# Patient Record
Sex: MALE | Race: WHITE | NOT HISPANIC OR LATINO | Employment: FULL TIME | URBAN - METROPOLITAN AREA
[De-identification: names, ages, dates, MRNs, and addresses within clinical notes are randomized per-mention and may not be internally consistent; named-entity substitution may affect disease eponyms.]

---

## 2022-08-10 ENCOUNTER — TRANSFERRED RECORDS (OUTPATIENT)
Dept: HEALTH INFORMATION MANAGEMENT | Facility: CLINIC | Age: 67
End: 2022-08-10

## 2022-12-07 ENCOUNTER — ANESTHESIA EVENT (OUTPATIENT)
Dept: SURGERY | Facility: CLINIC | Age: 67
End: 2022-12-07
Payer: COMMERCIAL

## 2022-12-07 RX ORDER — ACETAMINOPHEN 500 MG
500-1000 TABLET ORAL EVERY 6 HOURS PRN
COMMUNITY

## 2022-12-07 NOTE — PROGRESS NOTES
PTA medications updated by Medication Scribe prior to surgery via phone call with patient (last doses completed by Nurse)     Medication history sources: Patient and H&P  In the past week, patient estimated taking medication this percent of the time: Greater than 90%  Adherence assessment: N/A Not Observed    Significant changes made to the medication list:  None      Additional medication history information:   None    Medication reconciliation completed by provider prior to medication history? No    Time spent in this activity: 15 minutes    The information provided in this note is only as accurate as the sources available at the time of update(s)    Prior to Admission medications    Medication Sig Last Dose Taking? Auth Provider Long Term End Date   acetaminophen (TYLENOL) 500 MG tablet Take 500-1,000 mg by mouth every 6 hours as needed for mild pain  at PRN Yes Reported, Patient     Misc Natural Products (OSTEO BI-FLEX JOINT SHIELD PO) Take 1 capsule by mouth daily 12/1/2022 at AM Yes Reported, Patient     Multiple Vitamin (MULTIVITAMIN PO) Take 1 tablet by mouth daily 12/1/2022 at AM Yes Reported, Patient       Medication history completed by:    Gabriele Murphy CPhT  Medication Scribe  [facility]

## 2022-12-08 ENCOUNTER — HOSPITAL ENCOUNTER (INPATIENT)
Facility: CLINIC | Age: 67
LOS: 1 days | Discharge: HOME | End: 2022-12-09
Attending: ORTHOPAEDIC SURGERY | Admitting: ORTHOPAEDIC SURGERY
Payer: COMMERCIAL

## 2022-12-08 ENCOUNTER — APPOINTMENT (OUTPATIENT)
Dept: GENERAL RADIOLOGY | Facility: CLINIC | Age: 67
End: 2022-12-08
Attending: ORTHOPAEDIC SURGERY
Payer: COMMERCIAL

## 2022-12-08 ENCOUNTER — ANESTHESIA (OUTPATIENT)
Dept: SURGERY | Facility: CLINIC | Age: 67
End: 2022-12-08
Payer: COMMERCIAL

## 2022-12-08 DIAGNOSIS — Z96.661 STATUS POST RIGHT ANKLE JOINT REPLACEMENT: Primary | ICD-10-CM

## 2022-12-08 PROCEDURE — 250N000009 HC RX 250: Performed by: NURSE ANESTHETIST, CERTIFIED REGISTERED

## 2022-12-08 PROCEDURE — 360N000084 HC SURGERY LEVEL 4 W/ FLUORO, PER MIN: Performed by: ORTHOPAEDIC SURGERY

## 2022-12-08 PROCEDURE — 250N000011 HC RX IP 250 OP 636: Performed by: PHYSICIAN ASSISTANT

## 2022-12-08 PROCEDURE — 250N000013 HC RX MED GY IP 250 OP 250 PS 637: Performed by: PHYSICIAN ASSISTANT

## 2022-12-08 PROCEDURE — 258N000003 HC RX IP 258 OP 636: Performed by: PHYSICIAN ASSISTANT

## 2022-12-08 PROCEDURE — 250N000011 HC RX IP 250 OP 636: Performed by: NURSE ANESTHETIST, CERTIFIED REGISTERED

## 2022-12-08 PROCEDURE — C1776 JOINT DEVICE (IMPLANTABLE): HCPCS | Performed by: ORTHOPAEDIC SURGERY

## 2022-12-08 PROCEDURE — C1713 ANCHOR/SCREW BN/BN,TIS/BN: HCPCS | Performed by: ORTHOPAEDIC SURGERY

## 2022-12-08 PROCEDURE — 999N000141 HC STATISTIC PRE-PROCEDURE NURSING ASSESSMENT: Performed by: ORTHOPAEDIC SURGERY

## 2022-12-08 PROCEDURE — 250N000009 HC RX 250: Performed by: ANESTHESIOLOGY

## 2022-12-08 PROCEDURE — 258N000003 HC RX IP 258 OP 636: Performed by: ANESTHESIOLOGY

## 2022-12-08 PROCEDURE — 0SRF0JA REPLACEMENT OF RIGHT ANKLE JOINT WITH SYNTHETIC SUBSTITUTE, UNCEMENTED, OPEN APPROACH: ICD-10-PCS | Performed by: ORTHOPAEDIC SURGERY

## 2022-12-08 PROCEDURE — 271N000001 HC OR GENERAL SUPPLY NON-STERILE: Performed by: ORTHOPAEDIC SURGERY

## 2022-12-08 PROCEDURE — 250N000011 HC RX IP 250 OP 636: Performed by: ANESTHESIOLOGY

## 2022-12-08 PROCEDURE — 120N000001 HC R&B MED SURG/OB

## 2022-12-08 PROCEDURE — 250N000025 HC SEVOFLURANE, PER MIN: Performed by: ORTHOPAEDIC SURGERY

## 2022-12-08 PROCEDURE — 0MUQ0JZ SUPPLEMENT RIGHT ANKLE BURSA AND LIGAMENT WITH SYNTHETIC SUBSTITUTE, OPEN APPROACH: ICD-10-PCS | Performed by: ORTHOPAEDIC SURGERY

## 2022-12-08 PROCEDURE — 710N000009 HC RECOVERY PHASE 1, LEVEL 1, PER MIN: Performed by: ORTHOPAEDIC SURGERY

## 2022-12-08 PROCEDURE — 370N000017 HC ANESTHESIA TECHNICAL FEE, PER MIN: Performed by: ORTHOPAEDIC SURGERY

## 2022-12-08 PROCEDURE — 999N000179 XR SURGERY CARM FLUORO LESS THAN 5 MIN W STILLS

## 2022-12-08 PROCEDURE — 272N000001 HC OR GENERAL SUPPLY STERILE: Performed by: ORTHOPAEDIC SURGERY

## 2022-12-08 PROCEDURE — 0L8N3ZZ DIVISION OF RIGHT LOWER LEG TENDON, PERCUTANEOUS APPROACH: ICD-10-PCS | Performed by: ORTHOPAEDIC SURGERY

## 2022-12-08 PROCEDURE — 250N000009 HC RX 250: Performed by: ORTHOPAEDIC SURGERY

## 2022-12-08 PROCEDURE — 0QPG04Z REMOVAL OF INTERNAL FIXATION DEVICE FROM RIGHT TIBIA, OPEN APPROACH: ICD-10-PCS | Performed by: ORTHOPAEDIC SURGERY

## 2022-12-08 DEVICE — TIBIAL TRAY, XL, SIZE 1
Type: IMPLANTABLE DEVICE | Site: ANKLE | Status: FUNCTIONAL
Brand: SALTO TALARIS®

## 2022-12-08 DEVICE — INSERT, SIZE 1, RIGHT, TH8
Type: IMPLANTABLE DEVICE | Site: ANKLE | Status: FUNCTIONAL
Brand: SALTO TALARIS®

## 2022-12-08 DEVICE — TALAR DOME, SIZE 1, RIGHT
Type: IMPLANTABLE DEVICE | Site: ANKLE | Status: FUNCTIONAL
Brand: SALTO TALARIS®

## 2022-12-08 DEVICE — DBL LOADED 4.75MM BC SWVLK
Type: IMPLANTABLE DEVICE | Site: ANKLE | Status: FUNCTIONAL
Brand: ARTHREX®

## 2022-12-08 DEVICE — IB KIT, BC, W/ CC FT AND JUMPSTART
Type: IMPLANTABLE DEVICE | Site: ANKLE | Status: FUNCTIONAL
Brand: ARTHREX®

## 2022-12-08 RX ORDER — ACETAMINOPHEN 325 MG/1
650 TABLET ORAL EVERY 4 HOURS PRN
Status: DISCONTINUED | OUTPATIENT
Start: 2022-12-11 | End: 2022-12-09 | Stop reason: HOSPADM

## 2022-12-08 RX ORDER — SODIUM CHLORIDE, SODIUM LACTATE, POTASSIUM CHLORIDE, CALCIUM CHLORIDE 600; 310; 30; 20 MG/100ML; MG/100ML; MG/100ML; MG/100ML
INJECTION, SOLUTION INTRAVENOUS CONTINUOUS
Status: DISCONTINUED | OUTPATIENT
Start: 2022-12-08 | End: 2022-12-08 | Stop reason: HOSPADM

## 2022-12-08 RX ORDER — EPHEDRINE SULFATE 50 MG/ML
INJECTION, SOLUTION INTRAMUSCULAR; INTRAVENOUS; SUBCUTANEOUS PRN
Status: DISCONTINUED | OUTPATIENT
Start: 2022-12-08 | End: 2022-12-08

## 2022-12-08 RX ORDER — MAGNESIUM HYDROXIDE 1200 MG/15ML
LIQUID ORAL PRN
Status: DISCONTINUED | OUTPATIENT
Start: 2022-12-08 | End: 2022-12-08 | Stop reason: HOSPADM

## 2022-12-08 RX ORDER — FENTANYL CITRATE 50 UG/ML
INJECTION, SOLUTION INTRAMUSCULAR; INTRAVENOUS PRN
Status: DISCONTINUED | OUTPATIENT
Start: 2022-12-08 | End: 2022-12-08

## 2022-12-08 RX ORDER — GABAPENTIN 300 MG/1
300 CAPSULE ORAL 3 TIMES DAILY
Qty: 9 CAPSULE | Refills: 0 | Status: SHIPPED | OUTPATIENT
Start: 2022-12-08 | End: 2022-12-11

## 2022-12-08 RX ORDER — ONDANSETRON 2 MG/ML
INJECTION INTRAMUSCULAR; INTRAVENOUS PRN
Status: DISCONTINUED | OUTPATIENT
Start: 2022-12-08 | End: 2022-12-08

## 2022-12-08 RX ORDER — OXYCODONE HYDROCHLORIDE 5 MG/1
10 TABLET ORAL EVERY 4 HOURS PRN
Status: DISCONTINUED | OUTPATIENT
Start: 2022-12-08 | End: 2022-12-09 | Stop reason: HOSPADM

## 2022-12-08 RX ORDER — ONDANSETRON 4 MG/1
4 TABLET, ORALLY DISINTEGRATING ORAL EVERY 30 MIN PRN
Status: DISCONTINUED | OUTPATIENT
Start: 2022-12-08 | End: 2022-12-08 | Stop reason: HOSPADM

## 2022-12-08 RX ORDER — ONDANSETRON 2 MG/ML
4 INJECTION INTRAMUSCULAR; INTRAVENOUS EVERY 6 HOURS PRN
Status: DISCONTINUED | OUTPATIENT
Start: 2022-12-08 | End: 2022-12-09 | Stop reason: HOSPADM

## 2022-12-08 RX ORDER — LIDOCAINE 40 MG/G
CREAM TOPICAL
Status: DISCONTINUED | OUTPATIENT
Start: 2022-12-08 | End: 2022-12-09 | Stop reason: HOSPADM

## 2022-12-08 RX ORDER — ONDANSETRON 4 MG/1
4 TABLET, ORALLY DISINTEGRATING ORAL EVERY 6 HOURS PRN
Status: DISCONTINUED | OUTPATIENT
Start: 2022-12-08 | End: 2022-12-09 | Stop reason: HOSPADM

## 2022-12-08 RX ORDER — HYDROMORPHONE HCL IN WATER/PF 6 MG/30 ML
0.4 PATIENT CONTROLLED ANALGESIA SYRINGE INTRAVENOUS
Status: DISCONTINUED | OUTPATIENT
Start: 2022-12-08 | End: 2022-12-09 | Stop reason: HOSPADM

## 2022-12-08 RX ORDER — ACETAMINOPHEN 325 MG/1
975 TABLET ORAL EVERY 8 HOURS
Status: DISCONTINUED | OUTPATIENT
Start: 2022-12-08 | End: 2022-12-09 | Stop reason: HOSPADM

## 2022-12-08 RX ORDER — CEFAZOLIN SODIUM/WATER 2 G/20 ML
2 SYRINGE (ML) INTRAVENOUS
Status: DISCONTINUED | OUTPATIENT
Start: 2022-12-08 | End: 2022-12-08 | Stop reason: HOSPADM

## 2022-12-08 RX ORDER — HYDROMORPHONE HCL IN WATER/PF 6 MG/30 ML
0.2 PATIENT CONTROLLED ANALGESIA SYRINGE INTRAVENOUS
Status: DISCONTINUED | OUTPATIENT
Start: 2022-12-08 | End: 2022-12-09 | Stop reason: HOSPADM

## 2022-12-08 RX ORDER — NALOXONE HYDROCHLORIDE 0.4 MG/ML
0.4 INJECTION, SOLUTION INTRAMUSCULAR; INTRAVENOUS; SUBCUTANEOUS
Status: DISCONTINUED | OUTPATIENT
Start: 2022-12-08 | End: 2022-12-09 | Stop reason: HOSPADM

## 2022-12-08 RX ORDER — ONDANSETRON 2 MG/ML
4 INJECTION INTRAMUSCULAR; INTRAVENOUS EVERY 30 MIN PRN
Status: DISCONTINUED | OUTPATIENT
Start: 2022-12-08 | End: 2022-12-08 | Stop reason: HOSPADM

## 2022-12-08 RX ORDER — DEXAMETHASONE SODIUM PHOSPHATE 4 MG/ML
INJECTION, SOLUTION INTRA-ARTICULAR; INTRALESIONAL; INTRAMUSCULAR; INTRAVENOUS; SOFT TISSUE PRN
Status: DISCONTINUED | OUTPATIENT
Start: 2022-12-08 | End: 2022-12-08

## 2022-12-08 RX ORDER — SODIUM CHLORIDE 9 MG/ML
INJECTION, SOLUTION INTRAVENOUS CONTINUOUS
Status: DISCONTINUED | OUTPATIENT
Start: 2022-12-08 | End: 2022-12-09 | Stop reason: HOSPADM

## 2022-12-08 RX ORDER — PROPOFOL 10 MG/ML
INJECTION, EMULSION INTRAVENOUS PRN
Status: DISCONTINUED | OUTPATIENT
Start: 2022-12-08 | End: 2022-12-08

## 2022-12-08 RX ORDER — FENTANYL CITRATE 50 UG/ML
25 INJECTION, SOLUTION INTRAMUSCULAR; INTRAVENOUS EVERY 5 MIN PRN
Status: DISCONTINUED | OUTPATIENT
Start: 2022-12-08 | End: 2022-12-08 | Stop reason: HOSPADM

## 2022-12-08 RX ORDER — POLYETHYLENE GLYCOL 3350 17 G/17G
17 POWDER, FOR SOLUTION ORAL DAILY
Status: DISCONTINUED | OUTPATIENT
Start: 2022-12-09 | End: 2022-12-09 | Stop reason: HOSPADM

## 2022-12-08 RX ORDER — CEFAZOLIN SODIUM/WATER 2 G/20 ML
2 SYRINGE (ML) INTRAVENOUS SEE ADMIN INSTRUCTIONS
Status: DISCONTINUED | OUTPATIENT
Start: 2022-12-08 | End: 2022-12-08 | Stop reason: HOSPADM

## 2022-12-08 RX ORDER — FENTANYL CITRATE 50 UG/ML
50 INJECTION, SOLUTION INTRAMUSCULAR; INTRAVENOUS EVERY 5 MIN PRN
Status: DISCONTINUED | OUTPATIENT
Start: 2022-12-08 | End: 2022-12-08 | Stop reason: HOSPADM

## 2022-12-08 RX ORDER — CEFAZOLIN SODIUM 1 G/3ML
1 INJECTION, POWDER, FOR SOLUTION INTRAMUSCULAR; INTRAVENOUS EVERY 8 HOURS
Status: COMPLETED | OUTPATIENT
Start: 2022-12-08 | End: 2022-12-09

## 2022-12-08 RX ORDER — AMOXICILLIN 250 MG
1-2 CAPSULE ORAL 2 TIMES DAILY
Qty: 8 TABLET | Refills: 0 | Status: SHIPPED | OUTPATIENT
Start: 2022-12-08

## 2022-12-08 RX ORDER — PROCHLORPERAZINE MALEATE 5 MG
5 TABLET ORAL EVERY 6 HOURS PRN
Status: DISCONTINUED | OUTPATIENT
Start: 2022-12-08 | End: 2022-12-09 | Stop reason: HOSPADM

## 2022-12-08 RX ORDER — HYDROMORPHONE HCL IN WATER/PF 6 MG/30 ML
0.4 PATIENT CONTROLLED ANALGESIA SYRINGE INTRAVENOUS EVERY 5 MIN PRN
Status: DISCONTINUED | OUTPATIENT
Start: 2022-12-08 | End: 2022-12-08 | Stop reason: HOSPADM

## 2022-12-08 RX ORDER — TRANEXAMIC ACID 650 MG/1
1950 TABLET ORAL ONCE
Status: COMPLETED | OUTPATIENT
Start: 2022-12-08 | End: 2022-12-08

## 2022-12-08 RX ORDER — FENTANYL CITRATE 0.05 MG/ML
50 INJECTION, SOLUTION INTRAMUSCULAR; INTRAVENOUS
Status: COMPLETED | OUTPATIENT
Start: 2022-12-08 | End: 2022-12-08

## 2022-12-08 RX ORDER — HYDROXYZINE HYDROCHLORIDE 10 MG/1
10 TABLET, FILM COATED ORAL EVERY 6 HOURS PRN
Qty: 30 TABLET | Refills: 0 | Status: SHIPPED | OUTPATIENT
Start: 2022-12-08

## 2022-12-08 RX ORDER — HYDROMORPHONE HCL IN WATER/PF 6 MG/30 ML
0.2 PATIENT CONTROLLED ANALGESIA SYRINGE INTRAVENOUS EVERY 5 MIN PRN
Status: DISCONTINUED | OUTPATIENT
Start: 2022-12-08 | End: 2022-12-08 | Stop reason: HOSPADM

## 2022-12-08 RX ORDER — NALOXONE HYDROCHLORIDE 0.4 MG/ML
0.2 INJECTION, SOLUTION INTRAMUSCULAR; INTRAVENOUS; SUBCUTANEOUS
Status: DISCONTINUED | OUTPATIENT
Start: 2022-12-08 | End: 2022-12-09 | Stop reason: HOSPADM

## 2022-12-08 RX ORDER — GABAPENTIN 300 MG/1
300 CAPSULE ORAL AT BEDTIME
Status: DISCONTINUED | OUTPATIENT
Start: 2022-12-08 | End: 2022-12-09 | Stop reason: HOSPADM

## 2022-12-08 RX ORDER — ACETAMINOPHEN 325 MG/1
650 TABLET ORAL EVERY 4 HOURS PRN
Qty: 100 TABLET | Refills: 0 | Status: SHIPPED | OUTPATIENT
Start: 2022-12-08 | End: 2022-12-09

## 2022-12-08 RX ORDER — LIDOCAINE 40 MG/G
CREAM TOPICAL
Status: DISCONTINUED | OUTPATIENT
Start: 2022-12-08 | End: 2022-12-08 | Stop reason: HOSPADM

## 2022-12-08 RX ORDER — PROPOFOL 10 MG/ML
INJECTION, EMULSION INTRAVENOUS CONTINUOUS PRN
Status: DISCONTINUED | OUTPATIENT
Start: 2022-12-08 | End: 2022-12-08

## 2022-12-08 RX ORDER — ROPIVACAINE HYDROCHLORIDE 5 MG/ML
INJECTION, SOLUTION EPIDURAL; INFILTRATION; PERINEURAL
Status: DISCONTINUED | OUTPATIENT
Start: 2022-12-08 | End: 2022-12-08

## 2022-12-08 RX ORDER — OXYCODONE HYDROCHLORIDE 5 MG/1
5-10 TABLET ORAL EVERY 4 HOURS PRN
Qty: 26 TABLET | Refills: 0 | Status: SHIPPED | OUTPATIENT
Start: 2022-12-08

## 2022-12-08 RX ORDER — AMOXICILLIN 250 MG
1 CAPSULE ORAL 2 TIMES DAILY
Status: DISCONTINUED | OUTPATIENT
Start: 2022-12-08 | End: 2022-12-09 | Stop reason: HOSPADM

## 2022-12-08 RX ORDER — OXYCODONE HYDROCHLORIDE 5 MG/1
5 TABLET ORAL EVERY 4 HOURS PRN
Status: DISCONTINUED | OUTPATIENT
Start: 2022-12-08 | End: 2022-12-09 | Stop reason: HOSPADM

## 2022-12-08 RX ORDER — IBUPROFEN 600 MG/1
600 TABLET, FILM COATED ORAL EVERY 6 HOURS PRN
Status: DISCONTINUED | OUTPATIENT
Start: 2022-12-08 | End: 2022-12-09 | Stop reason: HOSPADM

## 2022-12-08 RX ORDER — BISACODYL 10 MG
10 SUPPOSITORY, RECTAL RECTAL DAILY PRN
Status: DISCONTINUED | OUTPATIENT
Start: 2022-12-08 | End: 2022-12-09 | Stop reason: HOSPADM

## 2022-12-08 RX ORDER — HYDROXYZINE HYDROCHLORIDE 10 MG/1
10 TABLET, FILM COATED ORAL EVERY 6 HOURS PRN
Status: DISCONTINUED | OUTPATIENT
Start: 2022-12-08 | End: 2022-12-09 | Stop reason: HOSPADM

## 2022-12-08 RX ADMIN — PROPOFOL 200 MG: 10 INJECTION, EMULSION INTRAVENOUS at 12:40

## 2022-12-08 RX ADMIN — TRANEXAMIC ACID 1950 MG: 650 TABLET ORAL at 11:03

## 2022-12-08 RX ADMIN — MIDAZOLAM HYDROCHLORIDE 1 MG: 1 INJECTION, SOLUTION INTRAMUSCULAR; INTRAVENOUS at 11:27

## 2022-12-08 RX ADMIN — MIDAZOLAM 2 MG: 1 INJECTION INTRAMUSCULAR; INTRAVENOUS at 12:35

## 2022-12-08 RX ADMIN — GABAPENTIN 300 MG: 300 CAPSULE ORAL at 20:36

## 2022-12-08 RX ADMIN — SODIUM CHLORIDE, POTASSIUM CHLORIDE, SODIUM LACTATE AND CALCIUM CHLORIDE: 600; 310; 30; 20 INJECTION, SOLUTION INTRAVENOUS at 11:29

## 2022-12-08 RX ADMIN — Medication 5 MG: at 13:27

## 2022-12-08 RX ADMIN — SODIUM CHLORIDE, POTASSIUM CHLORIDE, SODIUM LACTATE AND CALCIUM CHLORIDE: 600; 310; 30; 20 INJECTION, SOLUTION INTRAVENOUS at 14:49

## 2022-12-08 RX ADMIN — FENTANYL CITRATE 50 MCG: 50 INJECTION, SOLUTION INTRAMUSCULAR; INTRAVENOUS at 12:40

## 2022-12-08 RX ADMIN — FENTANYL CITRATE 25 MCG: 50 INJECTION, SOLUTION INTRAMUSCULAR; INTRAVENOUS at 11:29

## 2022-12-08 RX ADMIN — SODIUM CHLORIDE: 9 INJECTION, SOLUTION INTRAVENOUS at 17:08

## 2022-12-08 RX ADMIN — PROPOFOL 50 MCG/KG/MIN: 10 INJECTION, EMULSION INTRAVENOUS at 12:51

## 2022-12-08 RX ADMIN — SUCCINYLCHOLINE CHLORIDE 140 MG: 20 INJECTION, SOLUTION INTRAMUSCULAR; INTRAVENOUS; PARENTERAL at 12:46

## 2022-12-08 RX ADMIN — CEFAZOLIN 1 G: 1 INJECTION, POWDER, FOR SOLUTION INTRAMUSCULAR; INTRAVENOUS at 20:37

## 2022-12-08 RX ADMIN — Medication 2 G: at 12:35

## 2022-12-08 RX ADMIN — Medication 5 ML: at 11:37

## 2022-12-08 RX ADMIN — ACETAMINOPHEN 975 MG: 325 TABLET, FILM COATED ORAL at 18:11

## 2022-12-08 RX ADMIN — ROPIVACAINE HYDROCHLORIDE 5 ML: 5 INJECTION, SOLUTION EPIDURAL; INFILTRATION; PERINEURAL at 11:37

## 2022-12-08 RX ADMIN — ONDANSETRON 4 MG: 2 INJECTION INTRAMUSCULAR; INTRAVENOUS at 14:25

## 2022-12-08 RX ADMIN — FENTANYL CITRATE 25 MCG: 50 INJECTION, SOLUTION INTRAMUSCULAR; INTRAVENOUS at 11:32

## 2022-12-08 RX ADMIN — ASPIRIN 325 MG: 325 TABLET, COATED ORAL at 18:12

## 2022-12-08 RX ADMIN — ROPIVACAINE HYDROCHLORIDE 25 ML: 5 INJECTION, SOLUTION EPIDURAL; INFILTRATION; PERINEURAL at 11:33

## 2022-12-08 RX ADMIN — Medication 5 MG: at 13:13

## 2022-12-08 RX ADMIN — DEXAMETHASONE SODIUM PHOSPHATE 4 MG: 4 INJECTION, SOLUTION INTRA-ARTICULAR; INTRALESIONAL; INTRAMUSCULAR; INTRAVENOUS; SOFT TISSUE at 13:04

## 2022-12-08 ASSESSMENT — ACTIVITIES OF DAILY LIVING (ADL)
DRESSING/BATHING_DIFFICULTY: NO
ADLS_ACUITY_SCORE: 20
ADLS_ACUITY_SCORE: 35
WALKING_OR_CLIMBING_STAIRS_DIFFICULTY: NO
DOING_ERRANDS_INDEPENDENTLY_DIFFICULTY: NO
CHANGE_IN_FUNCTIONAL_STATUS_SINCE_ONSET_OF_CURRENT_ILLNESS/INJURY: NO
DIFFICULTY_EATING/SWALLOWING: NO
ADLS_ACUITY_SCORE: 20
ADLS_ACUITY_SCORE: 20
WEAR_GLASSES_OR_BLIND: YES
ADLS_ACUITY_SCORE: 35
TOILETING_ISSUES: NO
ADLS_ACUITY_SCORE: 20
ADLS_ACUITY_SCORE: 35
CONCENTRATING,_REMEMBERING_OR_MAKING_DECISIONS_DIFFICULTY: NO
FALL_HISTORY_WITHIN_LAST_SIX_MONTHS: NO

## 2022-12-08 NOTE — ANESTHESIA PREPROCEDURE EVALUATION
Anesthesia Pre-Procedure Evaluation    Patient: Jimmy Lopez   MRN: [id number] : 1955        Procedure : Procedure(s):  RIGHT TOTAL ANKLE ARTHROPLASTY, MODIFIED BROSTROM LATERAL LIGAMENT RECONSTRUCTION  POSSIBLE TENDO ACHILLES LENGTHENING, POSSIBLE TIBIA STAPLE REMOVAL RIGHT ANKLE          No past medical history on file.   History reviewed. No pertinent surgical history.   Allergies   Allergen Reactions     Penicillins Hives      Social History     Tobacco Use     Smoking status: Never     Smokeless tobacco: Never   Substance Use Topics     Alcohol use: Not on file      Wt Readings from Last 1 Encounters:   No data found for Wt        Anesthesia Evaluation   Pt has had prior anesthetic.     No history of anesthetic complications       ROS/MED HX  ENT/Pulmonary: Comment: Hearing loss  Allergic rhinitis      Neurologic:  - neg neurologic ROS     Cardiovascular:  - neg cardiovascular ROS     METS/Exercise Tolerance:     Hematologic:       Musculoskeletal:   (+) arthritis,     GI/Hepatic:  - neg GI/hepatic ROS     Renal/Genitourinary:  - neg Renal ROS     Endo:  - neg endo ROS     Psychiatric/Substance Use:  - neg psychiatric ROS     Infectious Disease:  - neg infectious disease ROS     Malignancy:       Other:            Physical Exam    Airway  airway exam normal           Respiratory Devices and Support         Dental  no notable dental history         Cardiovascular             Pulmonary                   OUTSIDE LABS:  CBC: No results found for: WBC, HGB, HCT, PLT  BMP: No results found for: NA, POTASSIUM, CHLORIDE, CO2, BUN, CR, GLC  COAGS: No results found for: PTT, INR, FIBR  POC: No results found for: BGM, HCG, HCGS  HEPATIC: No results found for: ALBUMIN, PROTTOTAL, ALT, AST, GGT, ALKPHOS, BILITOTAL, BILIDIRECT, FRIDA  OTHER: No results found for: PH, LACT, A1C, KVNG, PHOS, MAG, LIPASE, AMYLASE, TSH, T4, T3, CRP, SED    Anesthesia Plan    ASA Status:  2      Anesthesia Type: General.     - Airway:  LMA              Consents    Anesthesia Plan(s) and associated risks, benefits, and realistic alternatives discussed. Questions answered and patient/representative(s) expressed understanding.    - Discussed:     - Discussed with:  Patient         Postoperative Care    Pain management: IV analgesics, Peripheral nerve block (Single Shot), Multi-modal analgesia.   PONV prophylaxis: Ondansetron (or other 5HT-3), Dexamethasone or Solumedrol     Comments:                Avery Larios MD

## 2022-12-08 NOTE — ANESTHESIA PROCEDURE NOTES
Airway       Patient location during procedure: OR       Procedure Start/Stop Times: 12/8/2022 12:47 PM  Staff -        CRNA: Haroon Danielson APRN CRNA       Performed By: CRNA  Consent for Airway        Urgency: elective  Indications and Patient Condition       Indications for airway management: cristobal-procedural       Induction type:intravenous       Mask difficulty assessment: 2 - vent by mask + OA or adjuvant +/- NMBA    Final Airway Details       Final airway type: endotracheal airway       Successful airway: ETT - single  Endotracheal Airway Details        ETT size (mm): 8.0       Cuffed: yes       Successful intubation technique: video laryngoscopy       VL Blade Size: Glidescope 4       Grade View of Cords: 1       Adjucts: stylet       Position: Right       Measured from: lips       Secured at (cm): 23       Bite block used: None    Post intubation assessment        Placement verified by: capnometry, equal breath sounds and chest rise        Number of attempts at approach: 1       Number of other approaches attempted: 0       Secured with: pink tape       Ease of procedure: easy       Dentition: Intact and Unchanged    Medication(s) Administered   Medication Administration Time: 12/8/2022 12:47 PM    Additional Comments       Air leak with LMA - converted to GETA

## 2022-12-08 NOTE — ANESTHESIA PROCEDURE NOTES
Adductor canal (Femoral via Adductor Canal) Procedure Note    Pre-Procedure   Staff -        Anesthesiologist:  Avery Larios MD       Performed By: Anesthesiologist       Location: pre-op       Procedure Start/Stop Times: 12/8/2022 11:34 AM and 12/8/2022 11:37 AM       Pre-Anesthestic Checklist: patient identified, IV checked, site marked, risks and benefits discussed, informed consent, monitors and equipment checked, pre-op evaluation, at physician/surgeon's request and post-op pain management  Timeout:       Correct Patient: Yes        Correct Procedure: Yes        Correct Site: Yes        Correct Position: Yes        Correct Laterality: Yes        Site Marked: Yes  Procedure Documentation  Procedure: Adductor canal (Femoral via Adductor Canal)       Laterality: right       Patient Position: supine       Patient Prep/Sterile Barriers: sterile gloves, mask       Skin prep: Chloraprep       Needle Type: insulated and short bevel       Needle Gauge: 21.        Needle Length (Inches): 4        Ultrasound guided       1. Ultrasound was used to identify targeted nerve, plexus, vascular marker, or fascial plane and place a needle adjacent to it in real-time.       2. Ultrasound was used to visualize the spread of anesthetic in close proximity to the above referenced structure.       3. A permanent image is entered into the patient's record.       4. The visualized anatomic structures appeared normal.       5. There were no apparent abnormal pathologic findings.    Assessment/Narrative         The placement was negative for: blood aspirated, painful injection and site bleeding       Paresthesias: No.       Test dose of mL at.         Test dose negative, 3 minutes after injection, for signs of intravascular, subdural, or intrathecal injection.       Bolus given via needle..        Secured via.        Insertion/Infusion Method: Single Shot       Complications: none       Injection made incrementally with aspirations  "every 5 mL.    Medication(s) Administered   Bupivacaine 0.5% w/ 1:400K Epi (Injection) - Injection   5 mL - 12/8/2022 11:37:00 AM  Ropivacaine 0.5% PF (Infiltration) - Infiltration   5 mL - 12/8/2022 11:37:00 AM  Medication Administration Time: 12/8/2022 11:34 AM     Comments:  Pt mildly sedated, but communicative throughout.   Pt tolerated well.    No complications.        FOR [facility] ([facility]) ONLY:   Pain Team Contact information: please page the Pain Team Via [employer]. Search \"Pain\". During daytime hours, please page the attending first. At night please page the resident first.    "

## 2022-12-08 NOTE — OP NOTE
PREOPERATIVE DIAGNOSIS:   1.  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  2.  Status post right lateral ligament reconstruction with retained staple within the distal tibia.  3.  Chronic right ankle lateral ligament laxity.    POSTOPERATIVE DIAGNOSIS:   1.  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  2.  Status post right lateral ligament reconstruction with retained staple within the distal tibia.  3.  Chronic right ankle lateral ligament laxity.  4.  Right Achilles contracture.    PROCEDURE:  1.  Right total ankle arthroplasty.  2.  Right modified Broström lateral ligament reconstruction with InternalBrace augmentation.  3.  Right percutaneous tendo Achilles lengthening.  4.  Right distal tibia deep hardware removal.    ATTENDING SURGEON: Tye Argueta MD.    ASSISTANT SURGEON: Po Ruiz PA-C.    ANESTHESIA: General with regional nerve block.    ESTIMATED BLOOD LOSS: 10 mL.    IMPLANTS: Amy Talaris total ankle arthroplasty with size 1 XL tibia, size 1 talus, and 8mm polyethylene insert; Arthrex InternalBrace.    COMPLICATIONS: None apparent.    Of note, a skilled surgical assistant, Po Ruiz PA-C, was necessary and utilized during the case to assist with patient positioning, prepping and draping, soft tissue retraction and stabilizing the leg while completing the procedure, completing the bone and soft tissue work, wound closure, and splint application.  The assistant was present throughout the entire case.    INDICATIONS: Jimmy is a very pleasant 67-year-old gentleman who presented to my clinic for evaluation of right end-stage varus ankle degenerative joint disease. The patient has undergone multiple conservative treatments for control of discomfort including brace immobilization, ice, and anti-inflammatory medications. The patient noted significant decreased quality of life as a result of ankle pain.  The patient demonstrated preserved range of motion of the ankle, and  the benefits and risks of total arthroplasty versus arthrodesis were discussed with the patient. Given the patient's activity level, age, and desire to maintain motion at the ankle joint, arthroplasty was selected. The benefits and risks of surgery were discussed in full with the patient, and they provided informed consent to proceed.    DESCRIPTION OF PROCEDURE: The patient was identified in the preoperative holding area on the date of surgery. The operative site was marked with an indelible marker, and the patient was brought back to the operating room and transferred to the operating table in a supine position. All bony prominences were well padded. Anesthesia was administered without complication. The right lower extremity was prepped and draped in standard sterile fashion. A preoperative timeout was performed identifying the correct patient, procedure, operative site, antibiotic administration and equipment necessary for the procedure.    After Esmarch exsanguination, an upper thigh tourniquet was inflated. A longitudinal incision was made centered over the anterior ankle joint line. Soft tissue dissection was carefully carried down, protecting the superficial peroneal nerve at the very distal aspect of the incision. The extensor retinaculum was incised, taking care to maintain the anterior tibialis tendon within its own sheath for protection throughout the remainder of the case. Deep soft tissue dissection was carefully carried down, retracting the deep neurovascular bundle laterally with the digital extensor tendons for protection. There was marked synovitis over the anterior aspect of the ankle joint, which was carefully debrided. The ankle joint was easily visualized. Elevation of the periosteum was performed to allow for adequate visualization of the distal tibia and ankle joint line. The prominent distal tibia anterior spurs were resected to the level of the tibial plafond.  A pin was inserted in  percutaneous fashion in the proximal tibia at the level of the tibial tubercle for placement of the guide filiberto. The guide filiberto was then secured distally with a separate pin in the distal tibia. Utilizing fluoroscopic imaging, the guide filiberto was aligned in appropriate orientation, taking a 8mm bone resection from the distal tibia and correcting the patient's varus deformity. A size 1 component was selected to remove minimal bone from the medial malleolus and limit impingement on the fibula. Pins were placed to protect the medial and lateral malleoli during the distal tibia resection. After the bone cut, the anterior aspect of the distal tibia bone was resected from the wound.    The posterior talar chamfer guide was then secured, and the posterior chamfer cut created. The anterior talar chamfer yulissa was secured in appropriate position. The anterior chamfer cut was made. Care was taken to remove any prominent anterior talar neck bone to allow for appropriate positioning of the chamfer cutting guides. At this point, distractors were placed into the ankle joint to assess for balancing. The ankle was well balanced in the coronal plane though persistent lateral ligamentous laxity was noted as suspected.  The lateral chamfer guide was then secured. The lateral chamfer cut was created after creating the bell hole in the central aspect of the talus. The remaining posterior tibial bone cut was removed prior to placement of the trial components.  Fluoroscopic imaging confirmed appropriate alignment of the trial components.     With the trial components in place, the patient was noted to demonstrate relatively neutral dorsiflexion with the ankle and hindfoot in a neutral position. I elected to proceed with a tendoAchilles lengthening to improve dorsiflexion range of motion. A triple-cut rachel-section lengthening was performed with percutaneous incisions, sectioning the tendon medially, laterally, then medially progressing from  distal to proximal. The ankle was carefully manipulated with approximately 15 degrees of dorsiflexion achieved. The Achilles tendon remained intact following the manipulation.    In order to prepare the tibial keel, the Rivera staple within the distal tibia had to be removed.  A staple removal clamp was utilized to remove the Rivera staple without complication.    The wound was copiously irrigated. The above-noted components were implanted into position after preparing the tibial keel. Fluoroscopic imaging confirmed appropriate seating of the components. The patient demonstrated excellent range of motion in dorsiflexion and plantarflexion after implantation of the components. The wound was once again copiously irrigated. The extensor retinaculum was reapproximated with interrupted 2-0 Vicryl suture. The subcutaneous tissues and skin were reapproximated with interrupted 3-0 Monocryl and 3-0 nylon sutures respectively.    Due to the patient's chronic lateral ligament laxity, a modified Broström lateral ligament reconstruction was performed.  A curvilinear incision was made directly over the distal fibula extending distally over the lateral talus.  Soft tissue dissection was carefully carried down maintaining excellent hemostasis.  The extensor retinaculum was sharply incised in line with the anterior border of the distal fibula.  Significant attenuation of the ATFL was noted without any viable ligament tissue attached to the lateral process and body of the talus.  A 4.75mm anchor for the InternalBrace was secured within the lateral body of the talus.  The first layer of the lateral ligament reconstruction was then repaired, reinforcing the native ATFL tissue in pants over vest fashion with 0 Ethibond suture.  The ankle was held in stable alignment while securing the lateral ligaments.  The proximal anchor for the InternalBrace was then secured while maintaining appropriate tension along the suture brace.  I  initially attempted to utilize a 3.5mm anchor, however, the anchor did not achieve adequate purchase in the fibula and pulled out.  Instead, a 4.75mm anchor was secured within the distal fibula with excellent tension noted along the suture brace.  The ankle remained absolutely stable with anterior drawer and talar tilt testing.  Final fluoroscopic images confirmed appropriate alignment of the ankle replacement and ankle joint in the coronal plane.  The wound was copiously irrigated.  The extensor retinaculum was reinforced with 2-0 Vicryl suture.  Subcutaneous tissues and skin were reapproximated with interrupted 3-0 Monocryl and 3-0 nylon sutures respectively.    Xeroform and sterile dressings were applied. The patient was placed in a short leg splint. The patient was extubated and brought to the PACU in stable condition for further postoperative care.    Postoperatively, the patient will remain nonweightbearing on the right lower extremity. The patient will be placed on aspirin for DVT prophylaxis postoperatively. The patient will be admitted to the hospital for rehabilitation and pain control. The patient will plan return to clinic for followup in 2 weeks for repeat evaluation including wound check, suture removal, and weightbearing radiographs of the right ankle.  Partial weightbearing in the boot will be allowed starting at 2 weeks postoperatively.    Of note, all counts were correct at the conclusion of the case.

## 2022-12-08 NOTE — ANESTHESIA PROCEDURE NOTES
Airway       Patient location during procedure: OR       Procedure Start/Stop Times: 12/8/2022 12:41 PM  Staff -        Anesthesiologist:  Avery Larios MD       CRNA: Haroon Danielson APRN CRNA       Performed By: CRNA  Consent for Airway        Urgency: elective  Indications and Patient Condition       Indications for airway management: cristobal-procedural       Induction type:intravenous       Mask difficulty assessment: 2 - vent by mask + OA or adjuvant +/- NMBA    Final Airway Details       Final airway type: supraglottic airway    Supraglottic Airway Details        Type: LMA       Brand: Ambu AuraGain       LMA size: 5    Post intubation assessment        Placement verified by: capnometry, equal breath sounds and chest rise        Number of attempts at approach: 1       Number of other approaches attempted: 0       Secured with: pink tape       Ease of procedure: easy       Dentition: Intact and Unchanged    Medication(s) Administered   Medication Administration Time: 12/8/2022 12:41 PM    Additional Comments       Air leak with LMA. Converted to GETA

## 2022-12-08 NOTE — ANESTHESIA CARE TRANSFER NOTE
Patient: Jimmy Lopez    Procedure: Procedure(s):  RIGHT TOTAL ANKLE ARTHROPLASTY, MODIFIED BROSTROM LATERAL LIGAMENT RECONSTRUCTION  TENDO ACHILLES LENGTHENING, TIBIA STAPLE REMOVAL RIGHT ANKLE       Diagnosis: Arthritis of right ankle [M19.071]  Diagnosis Additional Information: No value filed.    Anesthesia Type:   General     Note:    Oropharynx: oropharynx clear of all foreign objects and spontaneously breathing  Level of Consciousness: drowsy  Oxygen Supplementation: face mask  Level of Supplemental Oxygen (L/min / FiO2): 8  Independent Airway: airway patency satisfactory and stable  Dentition: dentition unchanged  Vital Signs Stable: post-procedure vital signs reviewed and stable  Report to RN Given: handoff report given  Patient transferred to: PACU  Comments: Patient breathing spontaneously.  Follows commands.  Suctioned and extubated.  Exchanging air well.  Transferred to PACU with 8L O2 via mask.  Monitors on.  VSS.  Patent IV.  Report and transfer of care to RN.    Handoff Report: Identifed the Patient, Identified the Reponsible Provider, Reviewed the pertinent medical history, Discussed the surgical course, Reviewed Intra-OP anesthesia mangement and issues during anesthesia, Set expectations for post-procedure period and Allowed opportunity for questions and acknowledgement of understanding      Vitals:  Vitals Value Taken Time   /70 12/08/22 1447   Temp     Pulse 83 12/08/22 1450   Resp 11 12/08/22 1450   SpO2 99 % 12/08/22 1450   Vitals shown include unvalidated device data.    Electronically Signed By: BURTON Shelton CRNA  December 8, 2022  2:52 PM

## 2022-12-08 NOTE — ANESTHESIA PROCEDURE NOTES
Popliteal (Sciatic via Popliteal approach) Procedure Note    Pre-Procedure   Staff -        Anesthesiologist:  Avery Larios MD       Performed By: anesthesiologist       Location: pre-op       Procedure Start/Stop Times: 12/8/2022 11:28 AM and 12/8/2022 11:33 AM       Pre-Anesthestic Checklist: patient identified, IV checked, site marked, risks and benefits discussed, informed consent, monitors and equipment checked, pre-op evaluation, at physician/surgeon's request and post-op pain management  Timeout:       Correct Patient: Yes        Correct Procedure: Yes        Correct Site: Yes        Correct Position: Yes        Correct Laterality: Yes        Site Marked: Yes  Procedure Documentation  Procedure: Popliteal (Sciatic via Popliteal approach)       Laterality: right       Patient Position: supine (using limb elevator)       Skin prep: Chloraprep       Local skin infiltrated with 2 mL of 1% lidocaine.        Needle Type: insulated and short bevel       Needle Gauge: 21.        Needle Length (Inches): 4        Ultrasound guided       1. Ultrasound was used to identify targeted nerve, plexus, vascular marker, or fascial plane and place a needle adjacent to it in real-time.       2. Ultrasound was used to visualize the spread of anesthetic in close proximity to the above referenced structure.       3. A permanent image is entered into the patient's record.       4. The visualized anatomic structures appeared normal.       5. There were no apparent abnormal pathologic findings.    Assessment/Narrative         The placement was negative for: blood aspirated, painful injection and site bleeding       Paresthesias: No.       Bolus given via needle..        Secured via.        Insertion/Infusion Method: Single Shot       Complications: none       Injection made incrementally with aspirations every 3 mL.    Medication(s) Administered   Ropivacaine 0.5% PF (Infiltration) - Infiltration   25 mL - 12/8/2022 11:33:00  "AM  Medication Administration Time: 12/8/2022 11:28 AM     Comments:  Pt tolerated well.       FOR [facility] ([facility]) ONLY:   Pain Team Contact information: please page the Pain Team Via [employer]. Search \"Pain\". During daytime hours, please page the attending first. At night please page the resident first.    "

## 2022-12-09 ENCOUNTER — APPOINTMENT (OUTPATIENT)
Dept: PHYSICAL THERAPY | Facility: CLINIC | Age: 67
End: 2022-12-09
Attending: PHYSICIAN ASSISTANT
Payer: COMMERCIAL

## 2022-12-09 VITALS
HEIGHT: 71 IN | RESPIRATION RATE: 16 BRPM | HEART RATE: 68 BPM | SYSTOLIC BLOOD PRESSURE: 121 MMHG | DIASTOLIC BLOOD PRESSURE: 63 MMHG | BODY MASS INDEX: 23.24 KG/M2 | TEMPERATURE: 98.7 F | WEIGHT: 166 LBS | OXYGEN SATURATION: 97 %

## 2022-12-09 LAB
GLUCOSE BLD-MCNC: 93 MG/DL (ref 70–99)
HGB BLD-MCNC: 14.7 G/DL (ref 13.3–17.7)

## 2022-12-09 PROCEDURE — 97161 PT EVAL LOW COMPLEX 20 MIN: CPT | Mod: GP

## 2022-12-09 PROCEDURE — 250N000011 HC RX IP 250 OP 636: Performed by: PHYSICIAN ASSISTANT

## 2022-12-09 PROCEDURE — 85018 HEMOGLOBIN: CPT | Performed by: PHYSICIAN ASSISTANT

## 2022-12-09 PROCEDURE — 97530 THERAPEUTIC ACTIVITIES: CPT | Mod: GP

## 2022-12-09 PROCEDURE — 250N000013 HC RX MED GY IP 250 OP 250 PS 637: Performed by: PHYSICIAN ASSISTANT

## 2022-12-09 PROCEDURE — 36415 COLL VENOUS BLD VENIPUNCTURE: CPT | Performed by: ORTHOPAEDIC SURGERY

## 2022-12-09 PROCEDURE — 97116 GAIT TRAINING THERAPY: CPT | Mod: GP

## 2022-12-09 PROCEDURE — 82947 ASSAY GLUCOSE BLOOD QUANT: CPT | Performed by: ORTHOPAEDIC SURGERY

## 2022-12-09 RX ADMIN — ACETAMINOPHEN 975 MG: 325 TABLET, FILM COATED ORAL at 03:18

## 2022-12-09 RX ADMIN — ASPIRIN 325 MG: 325 TABLET, COATED ORAL at 08:26

## 2022-12-09 RX ADMIN — CEFAZOLIN 1 G: 1 INJECTION, POWDER, FOR SOLUTION INTRAMUSCULAR; INTRAVENOUS at 03:18

## 2022-12-09 RX ADMIN — SENNOSIDES AND DOCUSATE SODIUM 1 TABLET: 50; 8.6 TABLET ORAL at 08:26

## 2022-12-09 RX ADMIN — POLYETHYLENE GLYCOL 3350 17 G: 17 POWDER, FOR SOLUTION ORAL at 08:26

## 2022-12-09 RX ADMIN — OXYCODONE HYDROCHLORIDE 5 MG: 5 TABLET ORAL at 09:34

## 2022-12-09 ASSESSMENT — ACTIVITIES OF DAILY LIVING (ADL)
ADLS_ACUITY_SCORE: 20

## 2022-12-09 NOTE — PROGRESS NOTES
Reviewed discharge with pt.  Verbalized understanding.  Paperwork and RX given to pt.  Pt to discharge home with wife.  All belongings sent.

## 2022-12-09 NOTE — PLAN OF CARE
Patient vital signs are at baseline: Yes - VSS on RA, A&O x4  Patient able to ambulate as they were prior to admission or with assist devices provided by therapies during their stay:  No,  Reason: Not out of bed this shift  Patient MUST void prior to discharge:  Yes  Patient able to tolerate oral intake:  Yes - Regular diet, continue to monitor  Pain has adequate pain control using Oral analgesics:  No,  Reason:  Still feeling effects of block. No PRNs given this shift.

## 2022-12-09 NOTE — PLAN OF CARE
Goal Outcome Evaluation:    Patient vital signs are at baseline: Yes  Patient able to ambulate as they were prior to admission or with assist devices provided by therapies during their stay:  Yes  Patient MUST void prior to discharge:  Yes  Patient able to tolerate oral intake:  Yes  Pain has adequate pain control using Oral analgesics:  Yes  Does patient have an identified :  Yes  Has goal D/C date and time been discussed with patient:  Yes    CMS intact w/ some numbness to R foot, block still intact. Dressing CDI. Wedge in place. Denies pain, takes scheduled tylenol. Plan to discharge home today.

## 2022-12-09 NOTE — DISCHARGE SUMMARY
[facility] Discharge Summary        Jimmy Lopez MRN# [id number]   YOB: 1955 Age: 67 year old     Date of Admission:  12/8/2022  Date of Discharge:  12/9/2022 12:08 PM  Admitting Physician:  Tey Argueta MD  Discharge Physician: Tye Argueta MD  Discharging Service: Orthopaedics/Foot and Ankle Surgery     Primary Provider: Medicine, [facility]  Primary Care Physician Phone Number: [phone]         Discharge Diagnoses/Problem Oriented Hospital Course (Providers):    Jimmy Lopez was admitted on 12/8/2022 by Tye Argueta MD and I would refer you to their history and physical.  The following problems were addressed during his hospitalization:  S/P right total ankle arthroplasty with lateral ligament reconstruction with internal brace augmentation and tendoAchilles lengthening.          Code Status:      Full Code        Brief Hospital Stay Summary Sent Home With Patient in AVS:        Reason for your hospital stay      Status post right total ankle arthroplasty                    Pending Results:        Unresulted Labs Ordered in the Past 30 Days of this Admission     No orders found for last 31 day(s).            Discharge Instructions and Follow-Up:      Follow-up Appointments     Follow Up Care      Follow-up with your Surgeon Team in 2 weeks for wound check.               Discharge Disposition:      Discharged to home         Discharge Medications:        Discharge Medication List as of 12/9/2022 11:23 AM      START taking these medications    Details   gabapentin (NEURONTIN) 300 MG capsule Take 1 capsule (300 mg) by mouth 3 times daily for 3 days, Disp-9 capsule, R-0, E-Prescribe      hydrOXYzine (ATARAX) 10 MG tablet Take 1 tablet (10 mg) by mouth every 6 hours as needed for itching or anxiety (with pain, moderate pain), Disp-30 tablet, R-0, E-Prescribe      oxyCODONE (ROXICODONE) 5 MG tablet Take 1-2 tablets (5-10 mg) by mouth  "every 4 hours as needed for moderate to severe pain, Disp-26 tablet, R-0, E-Prescribe      senna-docusate (SENOKOT-S/PERICOLACE) 8.6-50 MG tablet Take 1-2 tablets by mouth 2 times daily Take while on oral narcotics to prevent or treat constipation., Disp-8 tablet, R-0, E-PrescribeWhile taking narcotics         CONTINUE these medications which have CHANGED    Details   aspirin (ASA) 325 MG EC tablet Take 1 tablet (325 mg) by mouth daily, Disp-42 tablet, R-0, E-Prescribe         CONTINUE these medications which have NOT CHANGED    Details   acetaminophen (TYLENOL) 500 MG tablet Take 500-1,000 mg by mouth every 6 hours as needed for mild pain, Historical      Misc Natural Products (OSTEO BI-FLEX JOINT SHIELD PO) Take 1 capsule by mouth daily, Historical      Multiple Vitamin (MULTIVITAMIN PO) Take 1 tablet by mouth daily, Historical               Allergies:         Allergies   Allergen Reactions     Penicillins Hives           Consultations This Hospital Stay:      No consultations were requested during this admission         Condition and Physical on Discharge:      Discharge condition: Stable   Vitals: Blood pressure 121/63, pulse 68, temperature 98.7  F (37.1  C), temperature source Oral, resp. rate 16, height 1.803 m (5' 11\"), weight 75.3 kg (166 lb), SpO2 97 %.  166 lbs 0 oz      Constitutional: A&Ox3, resting comfortably in bed appearing in no acute distress   Musculoskeletal: RLE is in a short leg splint. Splint is C/D/I.    Skin: No cyanosis in noted. There is no erythema noted.    Other: The patient denies any focal calf tenderness. Sensation is intact distally.          Discharge Time:      N/A        Image Results From This Hospital Stay (For Non-EPIC Providers):        Results for orders placed or performed during the hospital encounter of 12/08/22   XR Surgery TIERRA L/T 5 Min Fluoro w Stills    Narrative    XR SURGERY TIERRA FLUORO LESS THAN 5 MIN W STILLS 12/8/2022 2:49 PM     HISTORY: Right Total Ankle " Arthroplasty    NUMBER OF IMAGES ACQUIRED: 4    VIEWS: 2    FLUOROSCOPY TIME: .9 minutes      Impression    IMPRESSION: Intraprocedural spot films demonstrate placement of a  right total ankle arthroplasty. Please reference the surgical report  for further details.    PONCE MENCHACA MD         SYSTEM ID:  [id number]           Most Recent Lab Results In EPIC (For Non-EPIC Providers):      Results for orders placed or performed during the hospital encounter of 12/08/22 (from the past 24 hour(s))   Hemoglobin   Result Value Ref Range    Hemoglobin 14.7 13.3 - 17.7 g/dL   Glucose   Result Value Ref Range    Glucose 93 70 - 99 mg/dL

## 2022-12-09 NOTE — PROGRESS NOTES
12/09/22 0940   Appointment Info   Signing Clinician's Name / Credentials (PT) Angeline Damon, PT, DPT   Rehab Comments (PT) NWB R ankle   Living Environment   People in Home spouse   Current Living Arrangements house   Home Accessibility stairs to enter home   Number of Stairs, Main Entrance 2   Stair Railings, Main Entrance none   Transportation Anticipated family or friend will provide   Living Environment Comments Patient lives with his wife, Kelsy, in a 1 level house.   Self-Care   Usual Activity Tolerance good   Current Activity Tolerance good   Equipment Currently Used at Home none   Fall history within last six months no   Activity/Exercise/Self-Care Comment Independent with all mobility and ADLs at baseline.   General Information   Onset of Illness/Injury or Date of Surgery 12/08/22   Referring Physician Po Ruiz PA-C   Patient/Family Therapy Goals Statement (PT) to go home today   Pertinent History of Current Problem (include personal factors and/or comorbidities that impact the POC) Patient is 68 YO M POD #1 s/p R total ankly arthroplasty,  lateral ligament reconstruction with InternalBrace augmentation, Achilles lengthening and distal tibial hardware removal.   Existing Precautions/Restrictions fall;weight bearing   Weight-Bearing Status - RLE nonweight-bearing   Cognition   Affect/Mental Status (Cognition) WNL   Orientation Status (Cognition) oriented x 4   Follows Commands (Cognition) WNL   Pain Assessment   Patient Currently in Pain No   Integumentary/Edema   Integumentary/Edema Comments R lower leg in splint + ACE bandage; toes warm   Posture    Posture Not impaired   Range of Motion (ROM)   ROM Comment B LE AROM WNL except R ankle not assessed due to surgical procedure and splint in place   Strength (Manual Muscle Testing)   Strength (Manual Muscle Testing) Able to perform R SLR;Able to perform L SLR;strength is WFL   Bed Mobility   Bed Mobility no deficits identified   Comment,  (Bed Mobility) Independent supine <> sit   Transfers   Transfers sit-stand transfer   Sit-Stand Transfer   Sit-Stand [place] (Transfers) supervision   Assistive Device (Sit-Stand Transfers) crutches, axillary   Comment, (Sit-Stand Transfer) From EOB   Gait/Stairs (Locomotion)   [place] Level (Gait) supervision   Assistive Device (Gait) crutches, axillary   Distance in Feet 60'   Pattern (Gait) swing-to   Comment, (Gait/Stairs) Patient ambualted in room and hallway with quick olinda and correct sequencing.   Balance   Balance Comments Independent sitting balance; needs UE support in standing due to NWB on R LE   Sensory Examination   Sensory Perception Comments Resolving numbness on R LE, able to wiggle toes   Coordination   Coordination no deficits were identified   Clinical Impression   Criteria for Skilled Therapeutic Intervention Yes, treatment indicated   PT Diagnosis (PT) Impaired mobility   Influenced by the following impairments NWB on R LE   Functional limitations due to impairments Increased difficulty with transfers, gait, stairs and functional independence   Clinical Presentation (PT Evaluation Complexity) Stable/Uncomplicated   Clinical Presentation Rationale Medical status, clinical judgement, controlled pain   Clinical Decision Making (Complexity) low complexity   Planned Therapy Interventions (PT) gait training;cryotherapy;patient/family education;stair training;progressive activity/exercise;transfer training   Anticipated Equipment Needs at Discharge (PT) crutches, axillary  (Has his own)   Risk & Benefits of therapy have been explained evaluation/treatment results reviewed;care plan/treatment goals reviewed;risks/benefits reviewed;current/potential barriers reviewed;participants voiced agreement with care plan;participants included;patient   PT Total Evaluation Time   PT Eval, Low Complexity Minutes (79490) 9   Plan of Care Review   Plan of Care Reviewed With patient   Physical Therapy  Goals   PT Frequency One time eval and treatment only   PT Predicted Duration/Target Date for Goal Attainment 12/09/22   PT Goals Transfers;Gait;Stairs   PT: Transfers Modified independent;Sit to/from stand;Assistive device;Within precautions  (crutches, NWB R LE)   PT: Gait Modified independent;Crutches;50 feet;Within precautions  (NWB R LE)   PT: Stairs Minimal assist;2 stairs;Assistive device;Within precautions  (Crutches, NWB R LE)   Interventions   Interventions Quick Adds Gait Training;Therapeutic Activity   Therapeutic Activity   Therapeutic Activities: dynamic activities to improve functional performance Minutes (17445) 11   Symptoms Noted During/After Treatment None   Treatment Detail/Skilled Intervention Patient in supine, agreeable to PT treatment. Education on NWB on R LE and implications on mobility. Sit to stand from EOB with SBA and good compliance with NWB. Sit <>stand from toilet with cues for use of grab bars for safety with good return demo, SBA. Patient able to wash hands at sink with excellent compliance with NWB. Sit <> stand from EOB with Mod I and safe use of crutches. Sitting on EOB, patient performed lower body dressing with cues for set up and technique, SBA. Patient independently returned to supine. Education on keeping R LE elevated for edema and pain management Ice pack provided for under R knee. Patient in supine with alarm activated at end of session.   Gait Training   Gait Training Minutes (28393) 12   Symptoms Noted During/After Treatment (Gait Training) none   Treatment Detail/Skilled Intervention Patient ambulated ~ 80' with B axillary crutches and SBA. Cues for slower olinda, no overt losses of balance. Patient ambulated up 4 stairs with excellent return demo of technique, B axillary crutches and SBA. Descending stairs, Min A provided on first step due to mild imbalance with cues for improved crutch placement, CGA for next 3 steps with improved technique. Patient then ambulated  ~ 180' in hallway with SBA progressing to Mod I and no losses of balance.   Weight Bearing (Gait Training) nonweight-bearing   Assistive Device (Gait Training) axillary crutches   Physical Assist/Nonphysical Assist (Stairs) 1 person assist;verbal cues   Level of [place] (Stairs)   (See above in treatment detail)   Assistive Device (Stairs) axillary crutches   PT Discharge Planning   PT Plan Discharge from PT   PT Discharge Recommendation (DC Rec) home with assist   PT Rationale for DC Rec Patient has met acute PT goals and is demonstrating safe mobility with B axillary crutches. He will need Ax1 on stairs which his wife will be able to provide. No further acute PT needs at this time.   PT Brief overview of current status Independent bed mobility; Mod I sit <> stand transfers; Mod I gait with axillary crutches; Min A on stairs   Total Session Time   Timed Code Treatment Minutes 23   Total Session Time (sum of timed and untimed services) 32

## 2022-12-09 NOTE — PROGRESS NOTES
Orthopedic Surgery  Jimmy Lopez  2022  Admit Date:  2022  POD 1 Day Post-Op  S/P Procedure(s):  RIGHT TOTAL ANKLE ARTHROPLASTY, MODIFIED BROSTROM LATERAL LIGAMENT RECONSTRUCTION  TENDO ACHILLES LENGTHENING, TIBIA STAPLE REMOVAL RIGHT ANKLE    Patient is feeling great this am.  Pain controlled, block has not worn off yet.  Tolerating oral intake.  No events overnight. Discussed expectations.    Alert and orient to person, place, and time.  Vital Sign Ranges  Temperature Temp  Av.3  F (36.8  C)  Min: 97.5  F (36.4  C)  Max: 98.9  F (37.2  C)   Blood pressure Systolic (24hrs), Av , Min:115 , Max:142        Diastolic (24hrs), Av, Min:63, Max:84      Pulse Pulse  Av.1  Min: 62  Max: 86   Respirations Resp  Av.5  Min: 11  Max: 21   Pulse oximetry SpO2  Av.5 %  Min: 94 %  Max: 99 %       Right foot splint is clean, dry, and intact. Minimal erythema of the surrounding skin.  left calf is soft, non-tender.  right lower extremity block still intact    Labs:  No results for input(s): POTASSIUM in the last 18214 hours.  No results for input(s): HGB in the last 23388 hours.  No results for input(s): INR in the last 49654 hours.  No results for input(s): PLT in the last 22388 hours.    A/P   s/p  1.  Right total ankle arthroplasty.  2.  Right modified Broström lateral ligament reconstruction with InternalBrace augmentation.  3.  Right percutaneous tendo Achilles lengthening.  4.  Right distal tibia deep hardware removal.   Continue aspirin for DVT prophylaxis.     Mobilize with PT/OT NWB.     Continue current pain regimen.    2. Disposition   Anticipate d/c to home today    Kjerstin L Foss, PA-C

## 2022-12-09 NOTE — PROVIDER NOTIFICATION
PA aware of no discharge order.  Attending not around to sign orders, OK to discharge without discharge order.

## 2022-12-13 ENCOUNTER — DOCUMENTATION ONLY (OUTPATIENT)
Dept: OTHER | Facility: CLINIC | Age: 67
End: 2022-12-13

## 2023-04-02 ENCOUNTER — HEALTH MAINTENANCE LETTER (OUTPATIENT)
Age: 68
End: 2023-04-02

## 2024-06-02 ENCOUNTER — HEALTH MAINTENANCE LETTER (OUTPATIENT)
Age: 69
End: 2024-06-02

## 2025-06-15 ENCOUNTER — HEALTH MAINTENANCE LETTER (OUTPATIENT)
Age: 70
End: 2025-06-15

## (undated) DEVICE — DRAPE C-ARM 60X42" 1013

## (undated) DEVICE — PACK EXTREMITY SOP15EXFSD

## (undated) DEVICE — SUCTION CANISTER MEDIVAC LINER 3000ML W/LID 65651-530

## (undated) DEVICE — SU VICRYL 2-0 CT-2 27" UND J269H

## (undated) DEVICE — IMM LIMB ELEVATOR DC40-0203

## (undated) DEVICE — PREP CHLORAPREP 26ML TINTED HI-LITE ORANGE 930815

## (undated) DEVICE — SOL NACL 0.9% IRRIG 3000ML BAG 2B7477

## (undated) DEVICE — SOL NACL 0.9% IRRIG 1000ML BOTTLE 2F7124

## (undated) DEVICE — LINEN TOWEL PACK X5 5464

## (undated) DEVICE — ESU PENCIL W/SMOKE EVAC CVPLP2000

## (undated) DEVICE — DRSG XEROFORM 1X8"

## (undated) DEVICE — Device

## (undated) DEVICE — SOL WATER IRRIG 1000ML BOTTLE 2F7114

## (undated) DEVICE — DRILL BIT TORNIER 3MM LONG DWD060

## (undated) DEVICE — STPL SKIN 35W 6.9MM  PXW35

## (undated) DEVICE — DRAPE IOBAN INCISE 23X17" 6650EZ

## (undated) DEVICE — CAST PADDING 4" UNSTERILE 9044

## (undated) DEVICE — DRSG GAUZE 4X4" 3033

## (undated) DEVICE — SU MONOCRYL 3-0 PS-2 27" Y427H

## (undated) DEVICE — BLADE SAW INTEGRA 85X21X1.27MM SAW6945T

## (undated) DEVICE — SU ETHILON 3-0 FS-1 18" 669H

## (undated) DEVICE — BLADE SAW LG BONE TORNIER 70X13X1.27MM SAW6944T

## (undated) DEVICE — BLADE SAW RECIP STRK 77.5X11X1.23MM 0277-096-326

## (undated) DEVICE — DRILL BIT L135 MM OD3 MM LJV528T

## (undated) DEVICE — SU ETHIBOND 0 CT-1 CR 8X18" CX21D

## (undated) DEVICE — BLADE SAW TORNIER NARROW 11-2614

## (undated) DEVICE — SET OF 12 PINS +1 REAMER FOR SALTO ANKLE LJV-529T

## (undated) DEVICE — DRAPE MAYO STAND 23X54 8337

## (undated) DEVICE — BNDG ELASTIC 4" DBL LENGTH UNSTERILE 6611-14

## (undated) DEVICE — GOWN IMPERVIOUS SPECIALTY XLG/XLONG 32474

## (undated) DEVICE — DRAPE C-ARMOR 5 SIDED 5523

## (undated) DEVICE — ESU PENCIL W/SMOKE EVAC NEPTUNE STRYKER 0703-046-000

## (undated) DEVICE — SU VICRYL 3-0 PS-1 18" UND J683

## (undated) DEVICE — BLADE SAW RECIP STRK 70X6X0.025MM 0277-096-250S5

## (undated) DEVICE — CAST PADDING 4" STERILE 9044S

## (undated) DEVICE — CAST PLASTER SPLINT 5X30" 7395

## (undated) RX ORDER — FENTANYL CITRATE 50 UG/ML
INJECTION, SOLUTION INTRAMUSCULAR; INTRAVENOUS
Status: DISPENSED
Start: 2022-12-08

## (undated) RX ORDER — DIPHENHYDRAMINE HYDROCHLORIDE 50 MG/ML
INJECTION INTRAMUSCULAR; INTRAVENOUS
Status: DISPENSED
Start: 2022-12-08

## (undated) RX ORDER — TRANEXAMIC ACID 650 MG/1
TABLET ORAL
Status: DISPENSED
Start: 2022-12-08

## (undated) RX ORDER — EPHEDRINE SULFATE 50 MG/ML
INJECTION, SOLUTION INTRAMUSCULAR; INTRAVENOUS; SUBCUTANEOUS
Status: DISPENSED
Start: 2022-12-08

## (undated) RX ORDER — DEXAMETHASONE SODIUM PHOSPHATE 4 MG/ML
INJECTION, SOLUTION INTRA-ARTICULAR; INTRALESIONAL; INTRAMUSCULAR; INTRAVENOUS; SOFT TISSUE
Status: DISPENSED
Start: 2022-12-08

## (undated) RX ORDER — PROPOFOL 10 MG/ML
INJECTION, EMULSION INTRAVENOUS
Status: DISPENSED
Start: 2022-12-08

## (undated) RX ORDER — FENTANYL CITRATE 0.05 MG/ML
INJECTION, SOLUTION INTRAMUSCULAR; INTRAVENOUS
Status: DISPENSED
Start: 2022-12-08

## (undated) RX ORDER — CEFAZOLIN SODIUM/WATER 2 G/20 ML
SYRINGE (ML) INTRAVENOUS
Status: DISPENSED
Start: 2022-12-08

## (undated) RX ORDER — ONDANSETRON 2 MG/ML
INJECTION INTRAMUSCULAR; INTRAVENOUS
Status: DISPENSED
Start: 2022-12-08